# Patient Record
Sex: MALE | Employment: OTHER | URBAN - NONMETROPOLITAN AREA
[De-identification: names, ages, dates, MRNs, and addresses within clinical notes are randomized per-mention and may not be internally consistent; named-entity substitution may affect disease eponyms.]

---

## 2017-04-17 ENCOUNTER — OFFICE VISIT (OUTPATIENT)
Dept: PHYSICAL MEDICINE AND REHAB | Age: 82
End: 2017-04-17

## 2017-04-17 VITALS
DIASTOLIC BLOOD PRESSURE: 65 MMHG | SYSTOLIC BLOOD PRESSURE: 115 MMHG | HEIGHT: 66 IN | WEIGHT: 180 LBS | BODY MASS INDEX: 28.93 KG/M2 | HEART RATE: 73 BPM

## 2017-04-17 DIAGNOSIS — G20 PARKINSON'S DISEASE (HCC): Primary | ICD-10-CM

## 2017-04-17 DIAGNOSIS — R26.0 ATAXIC GAIT: ICD-10-CM

## 2017-04-17 PROCEDURE — 4040F PNEUMOC VAC/ADMIN/RCVD: CPT | Performed by: PSYCHIATRY & NEUROLOGY

## 2017-04-17 PROCEDURE — G8427 DOCREV CUR MEDS BY ELIG CLIN: HCPCS | Performed by: PSYCHIATRY & NEUROLOGY

## 2017-04-17 PROCEDURE — 1036F TOBACCO NON-USER: CPT | Performed by: PSYCHIATRY & NEUROLOGY

## 2017-04-17 PROCEDURE — 1123F ACP DISCUSS/DSCN MKR DOCD: CPT | Performed by: PSYCHIATRY & NEUROLOGY

## 2017-04-17 PROCEDURE — G8420 CALC BMI NORM PARAMETERS: HCPCS | Performed by: PSYCHIATRY & NEUROLOGY

## 2017-04-17 PROCEDURE — 99213 OFFICE O/P EST LOW 20 MIN: CPT | Performed by: PSYCHIATRY & NEUROLOGY

## 2017-04-17 RX ORDER — RASAGILINE 0.5 MG/1
0.5 TABLET ORAL DAILY
Qty: 30 TABLET | Refills: 1 | Status: SHIPPED | OUTPATIENT
Start: 2017-04-17 | End: 2018-07-25

## 2017-06-08 ENCOUNTER — OFFICE VISIT (OUTPATIENT)
Dept: PHYSICAL MEDICINE AND REHAB | Age: 82
End: 2017-06-08

## 2017-06-08 VITALS
WEIGHT: 180 LBS | BODY MASS INDEX: 28.93 KG/M2 | DIASTOLIC BLOOD PRESSURE: 59 MMHG | HEART RATE: 65 BPM | SYSTOLIC BLOOD PRESSURE: 126 MMHG

## 2017-06-08 DIAGNOSIS — G20 PARKINSON'S DISEASE (HCC): Primary | ICD-10-CM

## 2017-06-08 PROCEDURE — 4040F PNEUMOC VAC/ADMIN/RCVD: CPT | Performed by: PSYCHIATRY & NEUROLOGY

## 2017-06-08 PROCEDURE — 99213 OFFICE O/P EST LOW 20 MIN: CPT | Performed by: PSYCHIATRY & NEUROLOGY

## 2017-06-08 PROCEDURE — G8419 CALC BMI OUT NRM PARAM NOF/U: HCPCS | Performed by: PSYCHIATRY & NEUROLOGY

## 2017-06-08 PROCEDURE — 1036F TOBACCO NON-USER: CPT | Performed by: PSYCHIATRY & NEUROLOGY

## 2017-06-08 PROCEDURE — 1123F ACP DISCUSS/DSCN MKR DOCD: CPT | Performed by: PSYCHIATRY & NEUROLOGY

## 2017-06-08 PROCEDURE — G8427 DOCREV CUR MEDS BY ELIG CLIN: HCPCS | Performed by: PSYCHIATRY & NEUROLOGY

## 2017-09-13 ENCOUNTER — OFFICE VISIT (OUTPATIENT)
Dept: NEUROLOGY | Age: 82
End: 2017-09-13
Payer: MEDICARE

## 2017-09-13 VITALS — DIASTOLIC BLOOD PRESSURE: 67 MMHG | HEIGHT: 66 IN | SYSTOLIC BLOOD PRESSURE: 109 MMHG | HEART RATE: 66 BPM

## 2017-09-13 DIAGNOSIS — G20 PARKINSON'S DISEASE (HCC): Primary | ICD-10-CM

## 2017-09-13 DIAGNOSIS — R25.8 BRADYKINESIA: ICD-10-CM

## 2017-09-13 PROCEDURE — G8427 DOCREV CUR MEDS BY ELIG CLIN: HCPCS | Performed by: PSYCHIATRY & NEUROLOGY

## 2017-09-13 PROCEDURE — 1123F ACP DISCUSS/DSCN MKR DOCD: CPT | Performed by: PSYCHIATRY & NEUROLOGY

## 2017-09-13 PROCEDURE — G8417 CALC BMI ABV UP PARAM F/U: HCPCS | Performed by: PSYCHIATRY & NEUROLOGY

## 2017-09-13 PROCEDURE — 1036F TOBACCO NON-USER: CPT | Performed by: PSYCHIATRY & NEUROLOGY

## 2017-09-13 PROCEDURE — 4040F PNEUMOC VAC/ADMIN/RCVD: CPT | Performed by: PSYCHIATRY & NEUROLOGY

## 2017-09-13 PROCEDURE — 99213 OFFICE O/P EST LOW 20 MIN: CPT | Performed by: PSYCHIATRY & NEUROLOGY

## 2017-09-13 RX ORDER — FINASTERIDE 5 MG/1
5 TABLET, FILM COATED ORAL DAILY
COMMUNITY

## 2017-09-13 RX ORDER — TAMSULOSIN HYDROCHLORIDE 0.4 MG/1
0.4 CAPSULE ORAL DAILY
COMMUNITY

## 2017-11-06 DIAGNOSIS — G20 PARKINSON'S DISEASE (HCC): ICD-10-CM

## 2017-11-06 DIAGNOSIS — R26.0 ATAXIC GAIT: ICD-10-CM

## 2018-02-14 ENCOUNTER — OFFICE VISIT (OUTPATIENT)
Dept: NEUROLOGY | Age: 83
End: 2018-02-14
Payer: MEDICARE

## 2018-02-14 VITALS
HEART RATE: 75 BPM | DIASTOLIC BLOOD PRESSURE: 60 MMHG | WEIGHT: 178.57 LBS | HEIGHT: 66 IN | BODY MASS INDEX: 28.7 KG/M2 | SYSTOLIC BLOOD PRESSURE: 120 MMHG

## 2018-02-14 DIAGNOSIS — G20 PARKINSON'S DISEASE (HCC): Primary | ICD-10-CM

## 2018-02-14 DIAGNOSIS — R26.0 ATAXIC GAIT: ICD-10-CM

## 2018-02-14 PROCEDURE — G8428 CUR MEDS NOT DOCUMENT: HCPCS | Performed by: PSYCHIATRY & NEUROLOGY

## 2018-02-14 PROCEDURE — 99213 OFFICE O/P EST LOW 20 MIN: CPT | Performed by: PSYCHIATRY & NEUROLOGY

## 2018-02-14 PROCEDURE — 1036F TOBACCO NON-USER: CPT | Performed by: PSYCHIATRY & NEUROLOGY

## 2018-02-14 PROCEDURE — G8484 FLU IMMUNIZE NO ADMIN: HCPCS | Performed by: PSYCHIATRY & NEUROLOGY

## 2018-02-14 PROCEDURE — 1123F ACP DISCUSS/DSCN MKR DOCD: CPT | Performed by: PSYCHIATRY & NEUROLOGY

## 2018-02-14 PROCEDURE — 4040F PNEUMOC VAC/ADMIN/RCVD: CPT | Performed by: PSYCHIATRY & NEUROLOGY

## 2018-02-14 PROCEDURE — G8417 CALC BMI ABV UP PARAM F/U: HCPCS | Performed by: PSYCHIATRY & NEUROLOGY

## 2018-02-14 NOTE — PROGRESS NOTES
NEUROLOGY OUT PATIENT FOLLOW UP NOTE:  1/43/410013:26 AM    Janneth Paula is here for follow up for   Patient Active Problem List   Diagnosis    Falls infrequently    Bradykinesia    Follow up for symptoms of Parkinson's. The patient comes in with wife. He feels he is doing the same. He denies falls. He denies any falls. He is in wheel chair. He did not use the Azilect or Neupro due to cost.  He denies a dysphagia or hallucination. He is also on B12 supplements. He comes in today with his wife. ROS:  Skin no rash  Cardiac: no chest pain. No palpitations. Renal : no flank pain, no hematuria      Allergies   Allergen Reactions    Codeine Other (See Comments)     Nausea and dizziness    Gabapentin Other (See Comments)     Dizziness    Levothyroxine Other (See Comments)     Dizziness    Doxycycline Rash    Meloxicam Rash       Current Outpatient Prescriptions:     carbidopa-levodopa (SINEMET)  MG per tablet, 2 tablets three times daily, Disp: 180 tablet, Rfl: 5    finasteride (PROSCAR) 5 MG tablet, Take 5 mg by mouth daily, Disp: , Rfl:     tamsulosin (FLOMAX) 0.4 MG capsule, Take 0.4 mg by mouth daily, Disp: , Rfl:     rasagiline (AZILECT) 0.5 MG TABS, Take 1 tablet by mouth daily, Disp: 30 tablet, Rfl: 1    simvastatin (ZOCOR) 20 MG tablet, Take 20 mg by mouth daily, Disp: , Rfl:     isosorbide mononitrate (IMDUR) 30 MG CR tablet, Take 30 mg by mouth daily, Disp: , Rfl:     Misc. Devices (WALKER) MISC, 1 each by Does not apply route daily Dx: Parkinson's Disease, Ataxic gait ICD 10: G20, R26.0, Disp: 1 each, Rfl: 0    Omega-3 Fatty Acids (FISH OIL) 1000 MG CAPS, Take 2,000 mg by mouth daily, Disp: , Rfl:     cyanocobalamin 1000 MCG/ML injection, every 30 days. , Disp: , Rfl:     aspirin 81 MG EC tablet, Take 81 mg by mouth daily. , Disp: , Rfl:     carvedilol (COREG) 3.125 MG tablet, Take 3.125 mg by mouth 2 times daily , Disp: , Rfl:     clopidogrel (PLAVIX) 75 MG tablet, Take 1 tablet by mouth daily. , Disp: , Rfl:     glipiZIDE (GLUCOTROL) 10 MG CR tablet, Take 1 tablet by mouth daily. , Disp: , Rfl:     metFORMIN (GLUCOPHAGE) 1000 MG tablet, Take 1 tablet by mouth 2 times daily. , Disp: , Rfl:     rotigotine (NEUPRO) 2 MG/24HR, Place 1 patch onto the skin daily, Disp: 30 patch, Rfl: 3      PE:     Vitals:    02/14/18 1043   BP: 120/60   Site: Left Arm   Position: Sitting   Pulse: 75   Weight: 178 lb 9.2 oz (81 kg)   Height: 5' 6.14\" (1.68 m)        Gen: AO3, NAD, Language is Intact. he has good eye contact. Neck: There is no carotid bruits. The Neck is supple. Neuro: CN 2-12 grossly intact with no focal deficits. Power 5/5 Throughout symmetric, Reflexes are decreased symmetric. Cerebellar exam is Intact. Sensory exam is intact to light touch. Abnormal movement none, vibration decreased up to the knees bilaterally, proprioception intact. no resting tremor, no pinrolling, there is bradykinesia noted, no Hypohonia, near normal blink rate, he is wheel chair bound, he is unable to exit the chair without help. When out of chair, he shuffle and cannot turn without assistance. He would look down as he is ambulation and needs two point assist.  Skin: no rash. Osteo: he has hand arthritis. Assessment:    1. Parkinson's disease ([facility] [address]     2. Ataxic gait     He denies any falls. He is in wheel chair. He did not use the Azilect or Neupro due to cost. . His sugar is better controlled. He denies new symptoms. He has sensory ataxia also contributing to his symptoms. His gait is shuffling. After a detailed discussion with patient and his wife we agreed on the following plan. Plan:  1. Continue with Sinemet 25/100 mg to 2 tablets three times a day (take every five hours). Refills given. 2. Use your walker all the time for your safety. 3. Continue with Physical therapy recommendations. 4. Follow up in 5 months or sooner if needed. 5. Call if any questions or concerns.       Please call

## 2018-05-07 DIAGNOSIS — R26.0 ATAXIC GAIT: ICD-10-CM

## 2018-05-07 DIAGNOSIS — G20 PARKINSON'S DISEASE (HCC): ICD-10-CM

## 2018-07-25 ENCOUNTER — OFFICE VISIT (OUTPATIENT)
Dept: NEUROLOGY | Age: 83
End: 2018-07-25
Payer: MEDICARE

## 2018-07-25 VITALS
HEIGHT: 66 IN | DIASTOLIC BLOOD PRESSURE: 60 MMHG | WEIGHT: 180 LBS | HEART RATE: 72 BPM | SYSTOLIC BLOOD PRESSURE: 132 MMHG | BODY MASS INDEX: 28.93 KG/M2

## 2018-07-25 DIAGNOSIS — G20 PARKINSON'S DISEASE (HCC): Primary | ICD-10-CM

## 2018-07-25 DIAGNOSIS — R27.8 SENSORY ATAXIA: ICD-10-CM

## 2018-07-25 DIAGNOSIS — R26.0 ATAXIC GAIT: ICD-10-CM

## 2018-07-25 PROCEDURE — 1123F ACP DISCUSS/DSCN MKR DOCD: CPT | Performed by: PSYCHIATRY & NEUROLOGY

## 2018-07-25 PROCEDURE — G8417 CALC BMI ABV UP PARAM F/U: HCPCS | Performed by: PSYCHIATRY & NEUROLOGY

## 2018-07-25 PROCEDURE — 1101F PT FALLS ASSESS-DOCD LE1/YR: CPT | Performed by: PSYCHIATRY & NEUROLOGY

## 2018-07-25 PROCEDURE — G8427 DOCREV CUR MEDS BY ELIG CLIN: HCPCS | Performed by: PSYCHIATRY & NEUROLOGY

## 2018-07-25 PROCEDURE — 4040F PNEUMOC VAC/ADMIN/RCVD: CPT | Performed by: PSYCHIATRY & NEUROLOGY

## 2018-07-25 PROCEDURE — 1036F TOBACCO NON-USER: CPT | Performed by: PSYCHIATRY & NEUROLOGY

## 2018-07-25 PROCEDURE — 99213 OFFICE O/P EST LOW 20 MIN: CPT | Performed by: PSYCHIATRY & NEUROLOGY

## 2018-07-25 NOTE — PROGRESS NOTES
, Rfl:     glipiZIDE (GLUCOTROL) 10 MG CR tablet, Take 1 tablet by mouth daily. , Disp: , Rfl:     metFORMIN (GLUCOPHAGE) 1000 MG tablet, Take 1 tablet by mouth 2 times daily. , Disp: , Rfl:       PE:     Vitals:    07/25/18 1038   BP: 132/60   Site: Left Arm   Position: Sitting   Cuff Size: Medium Adult   Pulse: 72   Weight: 180 lb (81.6 kg)   Height: 5' 6\" (1.676 m)        Gen: AO3, NAD, Language is Intact. he has good eye contact. Neck: There is no carotid bruits. The Neck is supple. Neuro: CN 2-12 grossly intact with no focal deficits. Power 5/5 Throughout symmetric, Reflexes are decreased symmetric. Cerebellar exam is Intact. Sensory exam is intact to light touch. Abnormal movement none, vibration decreased up to the knees bilaterally, proprioception intact. no resting tremor, no pinrolling, there is bradykinesia noted, no Hypohonia, near normal blink rate, he is wheel chair bound, he is unable to exit the chair without help. When out of chair, he shuffle and cannot turn without assistance. He would look down as he is ambulation and needs two point assist.  Skin: no rash. Osteo: he has Bilateral hand arthritis. Assessment:     Diagnosis Orders   1. Parkinson's disease ([address]     2. Ataxic gait     3. Sensory ataxia     He is unable to ambulate he is on wheel chair. He did better after using the sinemet doing the same on Sinemet no hallucination or dysaphagia. He did not use the Azilect or Neupro due to cost. . His sugar is better controlled. He denies new symptoms. He has sensory ataxia also contributing to his symptoms. He does not use the walker. His PD symptoms appear to be controlled with the sinemet, however the  Cause of his balance trouble is due to the sensory ataxia related to the Long-standing diabetes diabetes in addition to the gait instability caused by the Parkinson's. After a detailed discussion with patient and his wife we agreed on the following plan. Plan:  1.  Continue with Sinemet

## 2018-07-25 NOTE — PATIENT INSTRUCTIONS
1. Continue with Sinemet 25/100 mg to 2 tablets three times a day (take every five hours). Refills given. 2. Use your walker all the time for your safety. 3. Continue with Physical therapy recommendations. 4. Follow up in 5 months or sooner if needed. 5. Call if any questions or concerns.

## 2018-10-29 DIAGNOSIS — R26.0 ATAXIC GAIT: ICD-10-CM

## 2018-10-29 DIAGNOSIS — G20 PARKINSON'S DISEASE (HCC): ICD-10-CM

## 2019-01-16 ENCOUNTER — OFFICE VISIT (OUTPATIENT)
Dept: NEUROLOGY | Age: 84
End: 2019-01-16
Payer: MEDICARE

## 2019-01-16 VITALS
DIASTOLIC BLOOD PRESSURE: 72 MMHG | SYSTOLIC BLOOD PRESSURE: 126 MMHG | HEART RATE: 68 BPM | WEIGHT: 180 LBS | HEIGHT: 66 IN | BODY MASS INDEX: 28.93 KG/M2

## 2019-01-16 DIAGNOSIS — R26.0 ATAXIC GAIT: ICD-10-CM

## 2019-01-16 DIAGNOSIS — G20 PARKINSON'S DISEASE (HCC): Primary | ICD-10-CM

## 2019-01-16 PROCEDURE — 1123F ACP DISCUSS/DSCN MKR DOCD: CPT | Performed by: PSYCHIATRY & NEUROLOGY

## 2019-01-16 PROCEDURE — 4040F PNEUMOC VAC/ADMIN/RCVD: CPT | Performed by: PSYCHIATRY & NEUROLOGY

## 2019-01-16 PROCEDURE — 1101F PT FALLS ASSESS-DOCD LE1/YR: CPT | Performed by: PSYCHIATRY & NEUROLOGY

## 2019-01-16 PROCEDURE — G8417 CALC BMI ABV UP PARAM F/U: HCPCS | Performed by: PSYCHIATRY & NEUROLOGY

## 2019-01-16 PROCEDURE — G8484 FLU IMMUNIZE NO ADMIN: HCPCS | Performed by: PSYCHIATRY & NEUROLOGY

## 2019-01-16 PROCEDURE — 99213 OFFICE O/P EST LOW 20 MIN: CPT | Performed by: PSYCHIATRY & NEUROLOGY

## 2019-01-16 PROCEDURE — 1036F TOBACCO NON-USER: CPT | Performed by: PSYCHIATRY & NEUROLOGY

## 2019-01-16 PROCEDURE — G8427 DOCREV CUR MEDS BY ELIG CLIN: HCPCS | Performed by: PSYCHIATRY & NEUROLOGY

## 2019-05-09 DIAGNOSIS — R26.0 ATAXIC GAIT: ICD-10-CM

## 2019-05-09 DIAGNOSIS — G20 PARKINSON'S DISEASE (HCC): Primary | ICD-10-CM

## 2019-06-17 ENCOUNTER — OFFICE VISIT (OUTPATIENT)
Dept: NEUROLOGY | Age: 84
End: 2019-06-17
Payer: MEDICARE

## 2019-06-17 VITALS
HEART RATE: 66 BPM | WEIGHT: 180 LBS | DIASTOLIC BLOOD PRESSURE: 78 MMHG | HEIGHT: 66 IN | SYSTOLIC BLOOD PRESSURE: 136 MMHG | BODY MASS INDEX: 28.93 KG/M2

## 2019-06-17 DIAGNOSIS — G20 PARKINSON'S DISEASE (HCC): Primary | ICD-10-CM

## 2019-06-17 DIAGNOSIS — R26.0 ATAXIC GAIT: ICD-10-CM

## 2019-06-17 DIAGNOSIS — R27.8 SENSORY ATAXIA: ICD-10-CM

## 2019-06-17 PROCEDURE — 99213 OFFICE O/P EST LOW 20 MIN: CPT | Performed by: PSYCHIATRY & NEUROLOGY

## 2019-06-17 PROCEDURE — G8427 DOCREV CUR MEDS BY ELIG CLIN: HCPCS | Performed by: PSYCHIATRY & NEUROLOGY

## 2019-06-17 PROCEDURE — 1123F ACP DISCUSS/DSCN MKR DOCD: CPT | Performed by: PSYCHIATRY & NEUROLOGY

## 2019-06-17 PROCEDURE — 1036F TOBACCO NON-USER: CPT | Performed by: PSYCHIATRY & NEUROLOGY

## 2019-06-17 PROCEDURE — 4040F PNEUMOC VAC/ADMIN/RCVD: CPT | Performed by: PSYCHIATRY & NEUROLOGY

## 2019-06-17 PROCEDURE — G8417 CALC BMI ABV UP PARAM F/U: HCPCS | Performed by: PSYCHIATRY & NEUROLOGY

## 2019-06-17 NOTE — PATIENT INSTRUCTIONS
1. Continue with Sinemet 25/100 mg 2 tablets three times a day (take every five hours). Refills given. 2. Use your wheel chair/walker for your safety. 3. Continue with Physical therapy recommendations. 4. Follow up in 6 months or sooner if needed. 5. Call if any questions or concerns.

## 2020-04-09 ENCOUNTER — TELEPHONE (OUTPATIENT)
Dept: NEUROLOGY | Age: 85
End: 2020-04-09

## 2020-06-24 ENCOUNTER — OFFICE VISIT (OUTPATIENT)
Dept: NEUROLOGY | Age: 85
End: 2020-06-24
Payer: MEDICARE

## 2020-06-24 VITALS
SYSTOLIC BLOOD PRESSURE: 132 MMHG | HEIGHT: 66 IN | WEIGHT: 180.2 LBS | HEART RATE: 84 BPM | DIASTOLIC BLOOD PRESSURE: 74 MMHG | TEMPERATURE: 97.4 F | BODY MASS INDEX: 28.96 KG/M2

## 2020-06-24 PROCEDURE — 1123F ACP DISCUSS/DSCN MKR DOCD: CPT | Performed by: PSYCHIATRY & NEUROLOGY

## 2020-06-24 PROCEDURE — G8427 DOCREV CUR MEDS BY ELIG CLIN: HCPCS | Performed by: PSYCHIATRY & NEUROLOGY

## 2020-06-24 PROCEDURE — G8417 CALC BMI ABV UP PARAM F/U: HCPCS | Performed by: PSYCHIATRY & NEUROLOGY

## 2020-06-24 PROCEDURE — 99213 OFFICE O/P EST LOW 20 MIN: CPT | Performed by: PSYCHIATRY & NEUROLOGY

## 2020-06-24 PROCEDURE — 4040F PNEUMOC VAC/ADMIN/RCVD: CPT | Performed by: PSYCHIATRY & NEUROLOGY

## 2020-06-24 PROCEDURE — 1036F TOBACCO NON-USER: CPT | Performed by: PSYCHIATRY & NEUROLOGY

## 2020-06-24 NOTE — PATIENT INSTRUCTIONS
1. Referral to physical medicine and rehab for evaluation for power wheelchair  2. Continue with Sinemet 25/100 mg 2 tablets three times a day (take every five hours). Refills given. 3. Use your wheel chair/walker for your safety. 4. Continue with Physical therapy recommendations. 5. Follow up in 3 months or sooner if needed.    6. Call if any questions or concerns

## 2020-07-06 NOTE — PROGRESS NOTES
NEUROLOGY OUT PATIENT FOLLOW UP NOTE:  6/24/202010:05 AM    Patsy Pandey is here for follow up for parkinson's disease, frequent falls. His wife feels he is worse, He is having more frequent falls. His last fall was 2 days ago. He is in a wheelchair. He is on sinemet and is tolerating the medication well. He comes in today with his wife to discuss the plan of care going forward. ROS:  Respiratory : no cough, no shortness of breath  Cardiac: no chest pain. No palpitations. Renal : no flank pain, no hematuria    Skin: no rash      Allergies   Allergen Reactions    Codeine Other (See Comments)     Nausea and dizziness    Gabapentin Other (See Comments)     Dizziness    Levothyroxine Other (See Comments)     Dizziness    Doxycycline Rash    Meloxicam Rash       Current Outpatient Medications:     carbidopa-levodopa (SINEMET)  MG per tablet, TAKE 2 TABLETS BY MOUTH THREE TIMES DAILY, Disp: 540 tablet, Rfl: 3    finasteride (PROSCAR) 5 MG tablet, Take 5 mg by mouth daily, Disp: , Rfl:     tamsulosin (FLOMAX) 0.4 MG capsule, Take 0.4 mg by mouth daily, Disp: , Rfl:     simvastatin (ZOCOR) 20 MG tablet, Take 20 mg by mouth daily, Disp: , Rfl:     isosorbide mononitrate (IMDUR) 30 MG CR tablet, Take 30 mg by mouth daily, Disp: , Rfl:     Misc. Devices (WALKER) MISC, 1 each by Does not apply route daily Dx: Parkinson's Disease, Ataxic gait ICD 10: G20, R26.0, Disp: 1 each, Rfl: 0    Omega-3 Fatty Acids (FISH OIL) 1000 MG CAPS, Take 2,000 mg by mouth daily, Disp: , Rfl:     cyanocobalamin 1000 MCG/ML injection, every 30 days. , Disp: , Rfl:     aspirin 81 MG EC tablet, Take 81 mg by mouth daily. , Disp: , Rfl:     clopidogrel (PLAVIX) 75 MG tablet, Take 1 tablet by mouth daily. , Disp: , Rfl:     glipiZIDE (GLUCOTROL) 10 MG CR tablet, Take 1 tablet by mouth daily. , Disp: , Rfl:     metFORMIN (GLUCOPHAGE) 1000 MG tablet, Take 500 mg by mouth 2 times daily , Disp: , Rfl:     carvedilol (COREG) 3.125 MG tablet, Take 3.125 mg by mouth 2 times daily , Disp: , Rfl:       PE:   Vitals:    06/24/20 0958   BP: 132/74   Site: Left Upper Arm   Position: Sitting   Cuff Size: Small Adult   Pulse: 84   Temp: 97.4 °F (36.3 °C)   TempSrc: Temporal   Weight: 180 lb 3.2 oz (81.7 kg)   Height: 5' 6\" (1.676 m)     General Appearance:  awake, alert, oriented, in no acute distress  Gen: NAD, Language is Intact. Skin: no rash, lesion, dry  to touch. warm  Head: no rash, no icterus  Neck: There is no carotid bruits. The Neck is supple. There is no neck lymphadenopathy. Neuro: CN 2-12 grossly intact with no focal deficits. Power 5/5 Throughout symmetric, Reflexes are symmetric. Long tracts are reduced distally. Cerebellar exam is Intact. Sensory exam is intact to light touch. Gait is not tested, he is in a wheelchair. There is no resting tremor. No hypophonia. There is mild bradykinesia. Musculoskeletal:  Has +ve bilateral  hand arthritis, no limitation of ROM in any of the four extremities. Lower extremities +1 edema  The abdomen is soft,  intact bowel sounds.          DATA:  Results for orders placed or performed in visit on 12/31/19   CBC   Result Value Ref Range    WBC 5.7 4.4 - 10.5 th/cmm    RBC 4.48 (L) 4.50 - 6.00 mil/cmm    Hemoglobin 13.9 13.5 - 16.5 gm/dL    Hematocrit 41.6 40.0 - 49.0 %    MCV 93.0 80 - 97 CU EMEKA    MCH 31.0 27.5 - 33.0 PG    MCHC 33.3 33.0 - 36.0 gm/dL    RDW 14.3 12.0 - 16.0 %    Platelets 538 103 - 293 th/cmm    Neutrophils % 72.5 (H) 40 - 70 %    Lymphocytes % 19.8 15 - 45 %    Monocytes % 5.5 2 - 10 %    Eosinophils % 1.6 0 - 6 %    Basophils % 0.6 0 - 2 %    Nucleated RBCs 0.1 <1 /100 WBC    Absolute Neut # 4100 1800 - 7700 /cmm    Absolute Lymph # 1100 1000 - 4800 /cmm    Absolute Mono # 300 0 - 800 /cmm    Absolute Eos # 100 0 - 500 /cmm    Absolute Baso # 0 0 - 200 /cmm   Microalbumin / Creatinine Urine Ratio   Result Value Ref Range    CREATININE, RANDOM URINE 78.9 mg/dL

## 2021-03-12 ENCOUNTER — VIRTUAL VISIT (OUTPATIENT)
Dept: NEUROLOGY | Age: 86
End: 2021-03-12
Payer: MEDICARE

## 2021-03-12 DIAGNOSIS — G20 PARKINSON'S DISEASE (HCC): Primary | ICD-10-CM

## 2021-03-12 DIAGNOSIS — R26.0 ATAXIC GAIT: ICD-10-CM

## 2021-03-12 DIAGNOSIS — R27.8 SENSORY ATAXIA: ICD-10-CM

## 2021-03-12 DIAGNOSIS — R29.6 FREQUENT FALLS: ICD-10-CM

## 2021-03-12 PROCEDURE — 99213 OFFICE O/P EST LOW 20 MIN: CPT | Performed by: PSYCHIATRY & NEUROLOGY

## 2021-03-12 PROCEDURE — 4040F PNEUMOC VAC/ADMIN/RCVD: CPT | Performed by: PSYCHIATRY & NEUROLOGY

## 2021-03-12 PROCEDURE — 1123F ACP DISCUSS/DSCN MKR DOCD: CPT | Performed by: PSYCHIATRY & NEUROLOGY

## 2021-03-12 PROCEDURE — G8428 CUR MEDS NOT DOCUMENT: HCPCS | Performed by: PSYCHIATRY & NEUROLOGY

## 2021-03-12 NOTE — PROGRESS NOTES
3/12/2021    TELEHEALTH EVALUATION -- Audio/Visual (During RGMBT-04 public health emergency)    HPI:    Nellie Lefort (:  1931) has requested an audio/video evaluation for the following concern(s):  Follow up for parkinson's disease, frequent falls. Parkinson symptoms appear to be stable, he still has gait ataxia, no reported falls, he is in a wheelchair. He is on sinemet and is tolerating the medication well. The patient is evaluated via video link to discuss plan of care going forward, is at the nursing home. Review of Systems    Prior to Visit Medications    Medication Sig Taking? Authorizing Provider   carbidopa-levodopa (SINEMET)  MG per tablet TAKE 2 TABLETS BY MOUTH THREE TIMES DAILY  AC Calhoun CNP   finasteride (PROSCAR) 5 MG tablet Take 5 mg by mouth daily  Historical Provider, MD   tamsulosin (FLOMAX) 0.4 MG capsule Take 0.4 mg by mouth daily  Historical Provider, MD   simvastatin (ZOCOR) 20 MG tablet Take 20 mg by mouth daily  Historical Provider, MD   isosorbide mononitrate (IMDUR) 30 MG CR tablet Take 30 mg by mouth daily  Historical Provider, MD   Misc. Devices (WALKER) MISC 1 each by Does not apply route daily Dx: Parkinson's Disease, Ataxic gait  ICD 10: G20, R26.0  AC Zelaya CNP   Omega-3 Fatty Acids (FISH OIL) 1000 MG CAPS Take 2,000 mg by mouth daily  Historical Provider, MD   cyanocobalamin 1000 MCG/ML injection every 30 days. Historical Provider, MD   aspirin 81 MG EC tablet Take 81 mg by mouth daily. Historical Provider, MD   carvedilol (COREG) 3.125 MG tablet Take 3.125 mg by mouth 2 times daily   Historical Provider, MD   clopidogrel (PLAVIX) 75 MG tablet Take 1 tablet by mouth daily. Historical Provider, MD   glipiZIDE (GLUCOTROL) 10 MG CR tablet Take 1 tablet by mouth daily.   Historical Provider, MD   metFORMIN (GLUCOPHAGE) 1000 MG tablet Take 500 mg by mouth 2 times daily   Historical Provider, MD       Social History     Tobacco Use    Smoking status: Never Smoker    Smokeless tobacco: Former User     Types: Chew   Substance Use Topics    Alcohol use: No     Alcohol/week: 0.0 standard drinks    Drug use: No        Past Surgical History:   Procedure Laterality Date    CORONARY ARTERY BYPASS GRAFT      EYE SURGERY      JOINT REPLACEMENT Right     knee    TOOTH EXTRACTION     ,   Social History     Tobacco Use    Smoking status: Never Smoker    Smokeless tobacco: Former User     Types: Chew   Substance Use Topics    Alcohol use: No     Alcohol/week: 0.0 standard drinks    Drug use: No   ,   Family History   Problem Relation Age of Onset    Diabetes Mother     Heart Disease Mother     Heart Disease Father     Heart Disease Sister     High Blood Pressure Sister        PHYSICAL EXAMINATION:  [ INSTRUCTIONS:  \"[x]\" Indicates a positive item  \"[]\" Indicates a negative item  -- DELETE ALL ITEMS NOT EXAMINED]  Vital Signs: (As obtained by patient/caregiver or practitioner observation)    Blood pressure-  Heart rate-    Respiratory rate-    Temperature-  Pulse oximetry-     Constitutional: [x] Appears well-developed and well-nourished [x] No apparent distress      [] Abnormal-   Mental status  [x] Alert and awake  [x] Oriented to person/place/time [x]Able to follow commands he is laying in bed cooperative, follows all commands. Eyes:  EOM    [x]  Normal  [] Abnormal-  Sclera  [x]  Normal  [] Abnormal -         Discharge [x]  None visible  [] Abnormal -    HENT:   [x] Normocephalic, atraumatic.   [] Abnormal   [x] Mouth/Throat: Mucous membranes are moist.     External Ears [x] Normal  [] Abnormal-     Neck: [x] No visualized mass     Pulmonary/Chest: [x] Respiratory effort normal.  [x] No visualized signs of difficulty breathing or respiratory distress        [] Abnormal-      Musculoskeletal:   [] Normal gait with no signs of ataxia         [x] Normal range of motion of neck        [] Abnormal-       Neurological:        [x] No Facial Asymmetry (Cranial nerve 7 motor function) (limited exam to video visit)          [x] No gaze palsy        [] Abnormal-         Skin:        [x] No significant exanthematous lesions or discoloration noted on facial skin         [] Abnormal-            Psychiatric:       [x] Normal Affect [x] No Hallucinations        [] Abnormal-     Other pertinent observable physical exam findings-   There is minimal bradykinesia, no hypophonia, near normal blink rate. ASSESSMENT/PLAN:  1. Parkinson's disease ([address]  2. Ataxic gait  3. Sensory ataxia  4. Frequent falls  The patient Parkinson symptoms appear to be stable, he still has gait ataxia, no reported falls, he is in a wheelchair. He has received physical therapy. He is tolerating medications well, no hallucination reported no dysphagia. After detailed discussion with patient we agreed on the following plan.  ,      1. Continue with Sinemet 25/100 mg 2 tablets three times a day (take every five hours). Refills given. 2. Use your wheel chair/walker for your safety. 3. Continue with Physical therapy recommendations. 4. Follow up in 5 months or sooner if needed. 5. Call if any questions or concerns       Return in about 5 months (around 8/12/2021). Scott Delgado, was evaluated through a synchronous (real-time) audio-video encounter. The patient (or guardian if applicable) is aware that this is a billable service. Verbal consent to proceed has been obtained within the past 12 months. The visit was conducted pursuant to the emergency declaration under the [facility], [address] [place] [address] authority and the [employer] General Act. Patient identification was verified, and a caregiver was present when appropriate. The patient was located in a state where the provider was credentialed to provide care.     Total time spent on this encounter: 21 min    --Bonnie Godfrey MD on 3/12/2021 at 2:09 PM    An electronic signature was used to authenticate this note.

## 2021-03-12 NOTE — PATIENT INSTRUCTIONS
1. Continue with Sinemet 25/100 mg 2 tablets three times a day (take every five hours). Refills given. 2. Use your wheel chair/walker for your safety. 3. Continue with Physical therapy recommendations. 4. Follow up in 5 months or sooner if needed.    5. Call if any questions or concerns

## 2021-04-12 ENCOUNTER — OUTSIDE SERVICES (OUTPATIENT)
Dept: FAMILY MEDICINE CLINIC | Age: 86
End: 2021-04-12
Payer: MEDICARE

## 2021-04-12 VITALS
RESPIRATION RATE: 18 BRPM | SYSTOLIC BLOOD PRESSURE: 137 MMHG | HEART RATE: 77 BPM | TEMPERATURE: 97.3 F | BODY MASS INDEX: 26.31 KG/M2 | WEIGHT: 163 LBS | DIASTOLIC BLOOD PRESSURE: 70 MMHG

## 2021-04-12 DIAGNOSIS — N20.1 RIGHT URETERAL CALCULUS: ICD-10-CM

## 2021-04-12 DIAGNOSIS — G20 PARKINSON'S DISEASE (HCC): ICD-10-CM

## 2021-04-12 DIAGNOSIS — E11.9 TYPE 2 DIABETES MELLITUS WITHOUT COMPLICATION, WITHOUT LONG-TERM CURRENT USE OF INSULIN (HCC): ICD-10-CM

## 2021-04-12 DIAGNOSIS — E78.49 OTHER HYPERLIPIDEMIA: ICD-10-CM

## 2021-04-12 DIAGNOSIS — N30.01 ACUTE CYSTITIS WITH HEMATURIA: ICD-10-CM

## 2021-04-12 DIAGNOSIS — I25.10 CORONARY ARTERY DISEASE INVOLVING NATIVE CORONARY ARTERY OF NATIVE HEART WITHOUT ANGINA PECTORIS: ICD-10-CM

## 2021-04-12 DIAGNOSIS — I10 ESSENTIAL HYPERTENSION: ICD-10-CM

## 2021-04-12 DIAGNOSIS — A41.51 SEPSIS DUE TO ESCHERICHIA COLI WITHOUT ACUTE ORGAN DYSFUNCTION (HCC): Primary | ICD-10-CM

## 2021-04-12 PROCEDURE — 99309 SBSQ NF CARE MODERATE MDM 30: CPT | Performed by: NURSE PRACTITIONER

## 2021-04-12 NOTE — PROGRESS NOTES
Landon Duff is a 80 y.o. male who presents today for his medical conditions/complaints as noted below. Chief Complaint   Patient presents with    Other     Medication review           HPI:     Maile Funez is a medication review. He was admitted on 4/11/2021 from [facility] after an admission for UTI and kidney stones. He was admitted from home due to fever/chills and associated with nausea and vomiting and generalized weakness. He was unable to provide any history in the emergency department but did deny any chest pain or shortness of breath. He had had a ureteral stent exchange done by Dr. Missy Ferguson on 4/6/2021. In the emergency department he was febrile, tachycardia, and UA was abnormal.  His urine white blood cell count was greater than 100,000, 2+ bacteria with a large amount of leukocyte Estrace, CT of the abdomen and pelvis showed gas in the bladder wall which was suspicious of erythematous cystitis, moderate right hydroretro-nephrosis and proximal and distal ureteral stones, mild left hydronephrosis with ureteral stents in place noted. Urology was consulted. He was placed on IV antibiotics and was admitted for further management. He is here for additional IV antibiotics and rehab. His blood culture from the hospital noted positive to be E. coli ESBL and he has been placed on ertapenem. Also noted during hospital stay he did have a an acute kidney injury which was superimposed by his chronic kidney disease. But this did improve with IV fluids. He does have a kidney stone which cystoscopy was done with a stent placement. He does follow with urology. He has past medical history of dementia, Parkinson's disease, CAD, hypertension, ventricular arrhythmias, hyperlipidemia, BPH, kidney stones, hydronephrosis, arthritis, anemia, coagulopathy. He does state this morning that he feels lousy and but is unable to tell me much beyond that.   He is up in his wheelchair and is attempting to wheel for therapy to see his endurance. He denies any chest pain/shortness of breath. He does have a PICC line in his right antecubital.      Past Medical History:   Diagnosis Date    Heart attack ([address]     Hyperlipidemia     Hypertension     Type II or unspecified type diabetes mellitus without mention of complication, not stated as uncontrolled       Past Surgical History:   Procedure Laterality Date    CORONARY ARTERY BYPASS GRAFT      EYE SURGERY      JOINT REPLACEMENT Right     knee    TOOTH EXTRACTION         Family History   Problem Relation Age of Onset    Diabetes Mother     Heart Disease Mother     Heart Disease Father     Heart Disease Sister     High Blood Pressure Sister        Social History     Tobacco Use    Smoking status: Never Smoker    Smokeless tobacco: Former User     Types: Chew   Substance Use Topics    Alcohol use: No     Alcohol/week: 0.0 standard drinks      Allergies   Allergen Reactions    Codeine Other (See Comments)     Nausea and dizziness    Gabapentin Other (See Comments)     Dizziness    Levothyroxine Other (See Comments)     Dizziness    Doxycycline Rash    Meloxicam Rash       Health Maintenance   Topic Date Due    COVID-19 Vaccine (1) Never done    DTaP/Tdap/Td vaccine (1 - Tdap) Never done    Shingles Vaccine (1 of 2) Never done    Pneumococcal 65+ years Vaccine (1 of 1 - PPSV23) Never done   [employer] Visit (AWV)  Never done    Lipid screen  07/03/2020    Flu vaccine (Season Ended) 09/01/2021    Hepatitis A vaccine  Aged Out    Hepatitis B vaccine  Aged Out    Hib vaccine  Aged Out    Meningococcal (ACWY) vaccine  Aged Out       Subjective:      Review of Systems   Unable to perform ROS: Dementia       Objective:     Physical Exam  Vitals signs and nursing note reviewed. Constitutional:       General: He is not in acute distress. Appearance: He is well-developed. He is obese. HENT:      Head: Normocephalic and atraumatic.       Right Ear: External

## 2021-04-23 ENCOUNTER — OUTSIDE SERVICES (OUTPATIENT)
Dept: FAMILY MEDICINE CLINIC | Age: 86
End: 2021-04-23
Payer: MEDICARE

## 2021-04-23 VITALS
BODY MASS INDEX: 25.41 KG/M2 | HEART RATE: 90 BPM | WEIGHT: 157.4 LBS | SYSTOLIC BLOOD PRESSURE: 96 MMHG | TEMPERATURE: 97.2 F | RESPIRATION RATE: 16 BRPM | DIASTOLIC BLOOD PRESSURE: 60 MMHG | OXYGEN SATURATION: 94 %

## 2021-04-23 DIAGNOSIS — G20 PARKINSON'S DISEASE (HCC): ICD-10-CM

## 2021-04-23 DIAGNOSIS — E78.49 OTHER HYPERLIPIDEMIA: ICD-10-CM

## 2021-04-23 DIAGNOSIS — E11.9 TYPE 2 DIABETES MELLITUS WITHOUT COMPLICATION, WITHOUT LONG-TERM CURRENT USE OF INSULIN (HCC): ICD-10-CM

## 2021-04-23 DIAGNOSIS — I25.10 CORONARY ARTERY DISEASE INVOLVING NATIVE CORONARY ARTERY OF NATIVE HEART WITHOUT ANGINA PECTORIS: ICD-10-CM

## 2021-04-23 DIAGNOSIS — I10 ESSENTIAL HYPERTENSION: ICD-10-CM

## 2021-04-23 DIAGNOSIS — N30.01 ACUTE CYSTITIS WITH HEMATURIA: ICD-10-CM

## 2021-04-23 DIAGNOSIS — A41.51 SEPSIS DUE TO ESCHERICHIA COLI WITHOUT ACUTE ORGAN DYSFUNCTION (HCC): Primary | ICD-10-CM

## 2021-04-23 DIAGNOSIS — N20.1 RIGHT URETERAL CALCULUS: ICD-10-CM

## 2021-04-23 PROCEDURE — 99490 CHRNC CARE MGMT STAFF 1ST 20: CPT | Performed by: FAMILY MEDICINE

## 2021-04-23 PROCEDURE — 99306 1ST NF CARE HIGH MDM 50: CPT | Performed by: FAMILY MEDICINE

## 2021-04-23 NOTE — PROGRESS NOTES
Semaj Chavez is a 80 y.o. male who presents today for his medical conditions/complaints as noted below. Chief Complaint   Patient presents with    Other     Admission visit           HPI:     Mary Ann Lieberman is seen today for admission visit. He was admitted on 4/11/2021 from [facility] after an admission for UTI and kidney stones. He had been admitted from home due to fever/chills and associated with nausea and vomiting along with generalized weakness. He had recently underwent a ureteral stent exchange done by Dr. Eri Webb on 4/6/2021. He was  febrile, tachycardic, and UA was abnormal in ED. His urine white blood cell count was greater than 100,000, 2+ bacteria with a large amount of leukocyte Estrace, CT of the abdomen and pelvis showed gas in the bladder wall which was suspicious of erythematous cystitis, moderate right hydroretro-nephrosis and proximal and distal ureteral stones, mild left hydronephrosis with ureteral stents in place noted. Urology was consulted. He was placed on IV antibiotics and was admitted for further management. He is here for additional IV antibiotics and rehab. His IV antibiotics are complete and he has underwent a follow up stent removal this last week. Incidentally his blood culture from the hospital noted positive to be E. coli ESBL. He has past medical history of dementia, Parkinson's disease, CAD, hypertension, ventricular arrhythmias, hyperlipidemia, BPH, kidney stones, hydronephrosis, arthritis, anemia, coagulopathy. He is seen while eating his breakfast but does not answer all questions appropriately. He is back on his Metformin but blood sugars remain high but are starting to trend down. I will increase today.        Past Medical History:   Diagnosis Date    Heart attack ([address]     Hyperlipidemia     Hypertension     Type II or unspecified type diabetes mellitus without mention of complication, not stated as uncontrolled       Past Surgical History:   Procedure Laterality Date    CORONARY ARTERY BYPASS GRAFT      EYE SURGERY      JOINT REPLACEMENT Right     knee    TOOTH EXTRACTION         Family History   Problem Relation Age of Onset    Diabetes Mother     Heart Disease Mother     Heart Disease Father     Heart Disease Sister     High Blood Pressure Sister        Social History     Tobacco Use    Smoking status: Never Smoker    Smokeless tobacco: Former User     Types: Chew   Substance Use Topics    Alcohol use: No     Alcohol/week: 0.0 standard drinks      Allergies   Allergen Reactions    Codeine Other (See Comments)     Nausea and dizziness    Gabapentin Other (See Comments)     Dizziness    Levothyroxine Other (See Comments)     Dizziness    Doxycycline Rash    Meloxicam Rash       Health Maintenance   Topic Date Due    COVID-19 Vaccine (1) Never done    DTaP/Tdap/Td vaccine (1 - Tdap) Never done    Shingles Vaccine (1 of 2) Never done    Pneumococcal 65+ years Vaccine (1 of 1 - PPSV23) Never done   [employer] Visit (AWV)  Never done    Lipid screen  07/03/2020    Flu vaccine (Season Ended) 09/01/2021    Hepatitis A vaccine  Aged Out    Hepatitis B vaccine  Aged Out    Hib vaccine  Aged Out    Meningococcal (ACWY) vaccine  Aged Out       Subjective:      Review of Systems   Unable to perform ROS: Dementia       Objective:     Physical Exam  Vitals signs and nursing note reviewed. Constitutional:       General: He is not in acute distress. Appearance: He is well-developed. HENT:      Head: Normocephalic and atraumatic. Right Ear: External ear normal.      Left Ear: External ear normal.      Nose: Nose normal.   Eyes:      General: No scleral icterus. Right eye: No discharge. Left eye: No discharge. Conjunctiva/sclera: Conjunctivae normal.   Neck:      Musculoskeletal: Neck supple. Thyroid: No thyromegaly. Vascular: No JVD. Cardiovascular:      Rate and Rhythm: Normal rate. Rhythm irregular.       Heart sounds: Normal heart sounds. Pulmonary:      Effort: Pulmonary effort is normal. No respiratory distress. Breath sounds: Normal breath sounds. No stridor. No wheezing or rales. Abdominal:      General: Bowel sounds are normal. There is no distension. Palpations: Abdomen is soft. Tenderness: There is no abdominal tenderness. Musculoskeletal: Normal range of motion. General: No deformity. Right shoulder: He exhibits no tenderness, no bony tenderness and no pain. Left shoulder: He exhibits no tenderness, no bony tenderness and no pain. Cervical back: He exhibits no tenderness, no bony tenderness and no pain. Thoracic back: He exhibits no tenderness, no bony tenderness, no pain and no spasm. Lumbar back: He exhibits no tenderness, no bony tenderness and no pain. Lymphadenopathy:      Cervical: No cervical adenopathy. Upper Body:      Right upper body: No supraclavicular adenopathy. Left upper body: No supraclavicular adenopathy. Skin:     General: Skin is warm and dry. Findings: No erythema or rash. Neurological:      Mental Status: He is alert. He is disoriented. Motor: No atrophy, abnormal muscle tone or seizure activity. Psychiatric:         Mood and Affect: Affect is blunt. Speech: Speech is delayed. Behavior: Behavior is slowed. Cognition and Memory: Memory is impaired. Judgment: Judgment is impulsive. BP 96/60   Pulse 90   Temp 97.2 °F (36.2 °C)   Resp 16   Wt 157 lb 6.4 oz (71.4 kg)   SpO2 94%   BMI 25.41 kg/m²     Assessment/Plan      1. Sepsis due to Escherichia coli without acute organ dysfunction (HCC) -antibiotics complete. Continue Flomax and Proscar monitor for symptoms. 2. Acute cystitis with hematuria -follow-up with urology as directed. 3. Right ureteral calculus -recent exchange of stent. Continue to monitor urine.    4. Type 2 diabetes mellitus without complication,

## 2021-05-06 ENCOUNTER — OUTSIDE SERVICES (OUTPATIENT)
Dept: FAMILY MEDICINE CLINIC | Age: 86
End: 2021-05-06
Payer: MEDICARE

## 2021-05-06 VITALS
SYSTOLIC BLOOD PRESSURE: 114 MMHG | WEIGHT: 157.2 LBS | DIASTOLIC BLOOD PRESSURE: 60 MMHG | OXYGEN SATURATION: 95 % | BODY MASS INDEX: 25.37 KG/M2 | HEART RATE: 94 BPM | TEMPERATURE: 97.3 F | RESPIRATION RATE: 16 BRPM

## 2021-05-06 DIAGNOSIS — G20 PARKINSON'S DISEASE (HCC): ICD-10-CM

## 2021-05-06 DIAGNOSIS — I95.1 ORTHOSTATIC HYPOTENSION: Primary | ICD-10-CM

## 2021-05-06 PROCEDURE — 99309 SBSQ NF CARE MODERATE MDM 30: CPT | Performed by: NURSE PRACTITIONER

## 2021-05-06 ASSESSMENT — ENCOUNTER SYMPTOMS
SHORTNESS OF BREATH: 0
NAUSEA: 1
DIARRHEA: 1
VOMITING: 1

## 2021-05-06 NOTE — PROGRESS NOTES
Missy Mason is a 80 y.o. male who presents today for his medical conditions/complaints as noted below. Chief Complaint   Patient presents with    Hypotension           HPI:     Patient at bedside today. He is up in his recliner and states he is tired and does not feel well. Patient states he was vomiting yesterday and did have some emesis overnight. Nursing staff does report he is having some diarrhea. Patient recently treated for UTI with Zyvox and was withheld from his Parkinson's disease per MD.  Sinemet restarted on 5/4/2021 after Zyvox was completed. Patient symptoms started on 5/5/2021. Patient had at least 2 episodes of orthostatic hypotension of a blood pressure after standing of 70/42. Midodrine was increased yesterday to 3 times daily and increased to 10 mg. Patient's lips are dry but staff does report that he is drinking. Will order stat CBC and BMP today and will also order IV fluids for today. Patient's muscles are not rigid and does not have a fever today. We will also hold therapy for today and continue to monitor. Heart rate is regular and respiratory rate is regular. Current Outpatient Medications   Medication Sig Dispense Refill    carbidopa-levodopa (SINEMET)  MG per tablet TAKE 2 TABLETS BY MOUTH THREE TIMES DAILY 540 tablet 3    finasteride (PROSCAR) 5 MG tablet Take 5 mg by mouth daily      tamsulosin (FLOMAX) 0.4 MG capsule Take 0.4 mg by mouth daily      simvastatin (ZOCOR) 20 MG tablet Take 20 mg by mouth daily      isosorbide mononitrate (IMDUR) 30 MG CR tablet Take 30 mg by mouth daily      Misc. Devices (WALKER) MISC 1 each by Does not apply route daily Dx: Parkinson's Disease, Ataxic gait  ICD 10: G20, R26.0 1 each 0    Omega-3 Fatty Acids (FISH OIL) 1000 MG CAPS Take 2,000 mg by mouth daily      cyanocobalamin 1000 MCG/ML injection every 30 days.  aspirin 81 MG EC tablet Take 81 mg by mouth daily.       carvedilol (COREG) 3.125 MG tablet Take is no abdominal tenderness. Musculoskeletal:         General: No deformity. Right lower leg: No edema. Left lower leg: No edema. Skin:     General: Skin is warm and dry. Neurological:      Mental Status: He is alert and oriented to person, place, and time. Motor: Weakness present. Psychiatric:         Speech: Speech is delayed. Cognition and Memory: Memory is impaired. Assessment/Plan      1. Orthostatic hypotension -continue midodrine. We will add IV fluids for 24 hours. We will hold therapy for 24-hour. 2. Parkinson's disease ([facility] [address] -continue Sinemet. Will monitor to ensure no adverse effects. If patient decompensates will send to emergency department.      Electronically signed by AC De La Rosa CNP on 5/6/2021 at 8:46 AM

## 2021-10-25 DIAGNOSIS — G20 PARKINSON'S DISEASE (HCC): Primary | ICD-10-CM

## 2022-05-25 ENCOUNTER — TELEPHONE (OUTPATIENT)
Dept: NEUROLOGY | Age: 87
End: 2022-05-25

## 2022-05-25 NOTE — TELEPHONE ENCOUNTER
Wife called to update office. Per wife, patient is not mobile any longer and is bed bound since 11/2021. She is not able to bring him in for appointment. She will call office if he has any problems or needs refills.

## 2022-08-01 DIAGNOSIS — G20 PARKINSON'S DISEASE (HCC): ICD-10-CM
